# Patient Record
Sex: FEMALE | Race: WHITE | NOT HISPANIC OR LATINO | Employment: UNEMPLOYED | URBAN - METROPOLITAN AREA
[De-identification: names, ages, dates, MRNs, and addresses within clinical notes are randomized per-mention and may not be internally consistent; named-entity substitution may affect disease eponyms.]

---

## 2018-10-23 ENCOUNTER — HOSPITAL ENCOUNTER (EMERGENCY)
Facility: HOSPITAL | Age: 53
Discharge: HOME | End: 2018-10-24
Attending: EMERGENCY MEDICINE
Payer: COMMERCIAL

## 2018-10-23 VITALS
WEIGHT: 157 LBS | HEIGHT: 65 IN | HEART RATE: 76 BPM | BODY MASS INDEX: 26.16 KG/M2 | DIASTOLIC BLOOD PRESSURE: 78 MMHG | TEMPERATURE: 98 F | SYSTOLIC BLOOD PRESSURE: 186 MMHG | OXYGEN SATURATION: 100 % | RESPIRATION RATE: 18 BRPM

## 2018-10-23 DIAGNOSIS — R00.2 PALPITATION: ICD-10-CM

## 2018-10-23 DIAGNOSIS — R07.89 CHEST PRESSURE: ICD-10-CM

## 2018-10-23 PROCEDURE — 93010 ELECTROCARDIOGRAM REPORT: CPT | Mod: ,,, | Performed by: INTERNAL MEDICINE

## 2018-10-23 PROCEDURE — 99285 EMERGENCY DEPT VISIT HI MDM: CPT | Mod: 25

## 2018-10-23 PROCEDURE — 93005 ELECTROCARDIOGRAM TRACING: CPT

## 2018-10-23 PROCEDURE — 99285 EMERGENCY DEPT VISIT HI MDM: CPT | Mod: ,,, | Performed by: EMERGENCY MEDICINE

## 2018-10-24 LAB
ALBUMIN SERPL BCP-MCNC: 3.7 G/DL
ALP SERPL-CCNC: 71 U/L
ALT SERPL W/O P-5'-P-CCNC: 16 U/L
ANION GAP SERPL CALC-SCNC: 8 MMOL/L
AST SERPL-CCNC: 16 U/L
BASOPHILS # BLD AUTO: 0.05 K/UL
BASOPHILS NFR BLD: 0.6 %
BILIRUB SERPL-MCNC: 0.3 MG/DL
BUN SERPL-MCNC: 15 MG/DL
CALCIUM SERPL-MCNC: 9.6 MG/DL
CHLORIDE SERPL-SCNC: 105 MMOL/L
CO2 SERPL-SCNC: 25 MMOL/L
CREAT SERPL-MCNC: 1.2 MG/DL
DIFFERENTIAL METHOD: NORMAL
EOSINOPHIL # BLD AUTO: 0.2 K/UL
EOSINOPHIL NFR BLD: 2 %
ERYTHROCYTE [DISTWIDTH] IN BLOOD BY AUTOMATED COUNT: 12.5 %
EST. GFR  (AFRICAN AMERICAN): >60 ML/MIN/1.73 M^2
EST. GFR  (NON AFRICAN AMERICAN): 52.1 ML/MIN/1.73 M^2
GLUCOSE SERPL-MCNC: 103 MG/DL
HCT VFR BLD AUTO: 40.8 %
HGB BLD-MCNC: 13.4 G/DL
IMM GRANULOCYTES # BLD AUTO: 0.02 K/UL
IMM GRANULOCYTES NFR BLD AUTO: 0.2 %
LYMPHOCYTES # BLD AUTO: 2.8 K/UL
LYMPHOCYTES NFR BLD: 32 %
MCH RBC QN AUTO: 28.9 PG
MCHC RBC AUTO-ENTMCNC: 32.8 G/DL
MCV RBC AUTO: 88 FL
MONOCYTES # BLD AUTO: 0.6 K/UL
MONOCYTES NFR BLD: 7.4 %
NEUTROPHILS # BLD AUTO: 5 K/UL
NEUTROPHILS NFR BLD: 57.8 %
NRBC BLD-RTO: 0 /100 WBC
PLATELET # BLD AUTO: 342 K/UL
PMV BLD AUTO: 9.2 FL
POTASSIUM SERPL-SCNC: 4 MMOL/L
PROT SERPL-MCNC: 6.9 G/DL
RBC # BLD AUTO: 4.64 M/UL
SODIUM SERPL-SCNC: 138 MMOL/L
TROPONIN I SERPL DL<=0.01 NG/ML-MCNC: <0.006 NG/ML
WBC # BLD AUTO: 8.65 K/UL

## 2018-10-24 PROCEDURE — 84484 ASSAY OF TROPONIN QUANT: CPT

## 2018-10-24 PROCEDURE — 85025 COMPLETE CBC W/AUTO DIFF WBC: CPT

## 2018-10-24 PROCEDURE — 80053 COMPREHEN METABOLIC PANEL: CPT

## 2018-10-24 NOTE — ED TRIAGE NOTES
53 year old female pt presents to the ed with complaints of palpitations following sob x 4 hours ago. Pt denies chest pain at this time but expresses hx of afib with cardioversion. Pt is ao x4.

## 2018-10-24 NOTE — ED PROVIDER NOTES
"Encounter Date: 10/23/2018    SCRIBE #1 NOTE: I, Amelia Sanders, am scribing for, and in the presence of,  Dr. Bean. I have scribed the entire note.       History     Chief Complaint   Patient presents with    Palpitations     Pt to ER c/o "chest feeling weird" and SOB while relaxing at home at 1900. She states hx of arrhythmias.     Shortness of Breath     The patient is a 53 y.o. female with PMHx of atrial fibrillation, who presents to the ED for intermitted dyspnea and palpitation since 19:00 today. Patient states she was at rest when episodes occurs. She states it is different from previous episodes. Patient describes it as a "flutter" which is different from her previous episodes. She denies any accompany symptoms with SOB. Denies nausea or diaphoresis. Patient is asymptomatic at this moment. She was admitted twice at an outside hospital for SVT previously. Has a family history of MI. Not on any medications for atrial fibrillation.       The history is provided by the patient.     Review of patient's allergies indicates:  No Known Allergies  Past Medical History:   Diagnosis Date    Atrial fibrillation      History reviewed. No pertinent surgical history.  History reviewed. No pertinent family history.  Social History     Tobacco Use    Smoking status: Never Smoker    Smokeless tobacco: Never Used   Substance Use Topics    Alcohol use: Yes    Drug use: Not on file     Review of Systems   Constitutional: Negative for diaphoresis and fever.   HENT: Negative for sore throat.    Respiratory:        Dyspnea   Cardiovascular: Positive for palpitations. Negative for chest pain.   Gastrointestinal: Negative for nausea.   Genitourinary: Negative for dysuria.   Musculoskeletal: Negative for back pain.   Skin: Negative for rash.   Neurological: Negative for weakness.   Hematological: Does not bruise/bleed easily.       Physical Exam     Initial Vitals   BP Pulse Resp Temp SpO2   10/23/18 2257 10/23/18 2257 10/23/18 " 2257 10/23/18 2257 10/23/18 2335   (!) 198/85 105 20 97.9 °F (36.6 °C) 100 %      MAP       --                Physical Exam  Appearance: No acute distress.  Skin: No rashes seen.  Good turgor.  No abrasions.  No ecchymoses.  Eyes: No conjunctival injection.  ENT: Oropharynx clear.    Chest: Clear to auscultation bilaterally.  Good air movement.  No wheezes.  No rhonchi.  Cardiovascular: Regular rate and rhythm.  No murmurs. No gallops. No rubs.  Abdomen: Soft.  Not distended.  Nontender.  No guarding.  No rebound. No Masses  Musculoskeletal: Good range of motion all joints.  No deformities.  Neck supple.  No meningismus.  Neurologic: Equal strength in upper and lower extremities bilaterally.  Normal sensation.  No facial droop.  Normal speech.    Mental Status:  Alert and oriented x 3.  Appropriate, conversant.    ED Course   Procedures  Labs Reviewed   COMPREHENSIVE METABOLIC PANEL - Abnormal; Notable for the following components:       Result Value    eGFR if non  52.1 (*)     All other components within normal limits   CBC W/ AUTO DIFFERENTIAL   TROPONIN I     EKG Readings: (Independently Interpreted)   Normal sinus rhythm. Occasional PACs. No ST changes. Normal intervals. No T-wave inversion.        Imaging Results          X-Ray Chest PA And Lateral (Final result)  Result time 10/24/18 00:58:49    Final result by Konstantin Paige MD (10/24/18 00:58:49)                 Impression:      No acute cardiopulmonary process.      Electronically signed by: Konstantin Paige MD  Date:    10/24/2018  Time:    00:58             Narrative:    EXAMINATION:  XR CHEST PA AND LATERAL    CLINICAL HISTORY:  Other chest pain    TECHNIQUE:  PA and lateral views of the chest were performed.    COMPARISON:  None.    FINDINGS:  There is no consolidation, effusion, or pneumothorax.    Cardiomediastinal silhouette is unremarkable.    Regional osseous structures are unremarkable.                                 Medical  Decision Making:   History:   Old Medical Records: I decided to obtain old medical records.  Initial Assessment:   Patient here with vague chest palpitation. No signs of arrhythmia such as SVT or atrial fibrillation. Hemodynamically stable. Given her high risk of ACS due to her family history, a troponin and labs were done. It show no acute abnormalities. Chest Xray was unremarkable. Patient otherwise continue to be asymptomatic in the emergency department. Stable for discharge home. Advised pt to follow up with PCP or return if concerning symptoms arise. Pt understands and agrees with plan. Will d/c home.       Independently Interpreted Test(s):   I have ordered and independently interpreted EKG Reading(s) - see prior notes  Clinical Tests:   Lab Tests: Ordered and Reviewed  Radiological Study: Ordered and Reviewed  Medical Tests: Ordered and Reviewed            Scribe Attestation:   Scribe #1: I performed the above scribed service and the documentation accurately describes the services I performed. I attest to the accuracy of the note.               Clinical Impression:   Diagnoses of Palpitation and Chest pressure were pertinent to this visit.      Disposition:   Disposition: Discharged  Condition: Stable                        Franki Bean MD  10/24/18 0728

## 2018-10-24 NOTE — ED NOTES
Pain: Denies at present.    Psychosocial: Patient is calm and cooperative. Patients insight and judgement are appropriate to situation. Appears clean, well maintained, with clothing appropriate to environment. No evidence of delusions, hallucinations, or psychosis.    Neuro: Eyes open spontaneously. Awake, alert, oriented x 4. Speech clear and appropriate. Tolerating saliva secretions well. Able to follow commands, demonstrating ability to actively and appropriately communicate within context of current conversation. Symmetrical facial muscles. Moving all extremities well with no noted weakness. Adequate muscle tone present. Movement is purposeful. No evidence of impaired sensation. Responds to external stimuli with appropriate reflexes.     Airway: Bilateral chest rise and fall. RR regular and non-labored. Air entry patent and clear x 5 lobes of the lungs. No crepitus or subcutaneous emphysema noted on palpation.     Circulatory: Skin warm, dry, and pink. Apical and radial pulses strong and regular. Capillary refill/skin blanching less than 3 seconds to distal of 4 extremities. Placed on CM in NSR without ectopy.    Abdomen: Abdomen obese, soft and non-distended. Positive normo-active bowel sounds x 4 quadrants.     Urinary: Patient reports routine urination without pain, frequency, or urgency. Voids independently. Reports urine appears wally/yellow in color.    Extremities: No redness, heat, swelling, deformity, or pain.     Skin: Intact with no bruising/discolorations noted.

## 2021-08-17 ENCOUNTER — IMMUNIZATION (OUTPATIENT)
Dept: FAMILY MEDICINE | Facility: CLINIC | Age: 56
End: 2021-08-17
Payer: COMMERCIAL

## 2021-08-17 DIAGNOSIS — Z23 NEED FOR VACCINATION: Primary | ICD-10-CM

## 2021-08-17 PROCEDURE — 0001A COVID-19, MRNA, LNP-S, PF, 30 MCG/0.3 ML DOSE VACCINE: ICD-10-PCS | Mod: CV19,,, | Performed by: FAMILY MEDICINE

## 2021-08-17 PROCEDURE — 91300 COVID-19, MRNA, LNP-S, PF, 30 MCG/0.3 ML DOSE VACCINE: ICD-10-PCS | Mod: ,,, | Performed by: FAMILY MEDICINE

## 2021-08-17 PROCEDURE — 0001A COVID-19, MRNA, LNP-S, PF, 30 MCG/0.3 ML DOSE VACCINE: CPT | Mod: CV19,,, | Performed by: FAMILY MEDICINE

## 2021-08-17 PROCEDURE — 91300 COVID-19, MRNA, LNP-S, PF, 30 MCG/0.3 ML DOSE VACCINE: CPT | Mod: ,,, | Performed by: FAMILY MEDICINE

## 2021-09-21 ENCOUNTER — IMMUNIZATION (OUTPATIENT)
Dept: FAMILY MEDICINE | Facility: CLINIC | Age: 56
End: 2021-09-21
Payer: COMMERCIAL

## 2021-09-21 DIAGNOSIS — Z23 NEED FOR VACCINATION: Primary | ICD-10-CM

## 2021-09-21 PROCEDURE — 0002A COVID-19, MRNA, LNP-S, PF, 30 MCG/0.3 ML DOSE VACCINE: CPT | Mod: PBBFAC | Performed by: FAMILY MEDICINE

## 2021-09-21 PROCEDURE — 91300 COVID-19, MRNA, LNP-S, PF, 30 MCG/0.3 ML DOSE VACCINE: CPT | Mod: PBBFAC | Performed by: FAMILY MEDICINE

## 2021-12-28 ENCOUNTER — LAB VISIT (OUTPATIENT)
Dept: PRIMARY CARE CLINIC | Facility: OTHER | Age: 56
End: 2021-12-28
Payer: COMMERCIAL

## 2021-12-28 DIAGNOSIS — R05.9 COUGH: ICD-10-CM

## 2021-12-28 PROCEDURE — U0003 INFECTIOUS AGENT DETECTION BY NUCLEIC ACID (DNA OR RNA); SEVERE ACUTE RESPIRATORY SYNDROME CORONAVIRUS 2 (SARS-COV-2) (CORONAVIRUS DISEASE [COVID-19]), AMPLIFIED PROBE TECHNIQUE, MAKING USE OF HIGH THROUGHPUT TECHNOLOGIES AS DESCRIBED BY CMS-2020-01-R: HCPCS

## 2021-12-30 LAB
SARS-COV-2 RNA RESP QL NAA+PROBE: NOT DETECTED
SARS-COV-2- CYCLE NUMBER: NORMAL

## 2022-11-23 ENCOUNTER — TELEPHONE (OUTPATIENT)
Dept: URGENT CARE | Facility: CLINIC | Age: 57
End: 2022-11-23

## 2022-11-23 ENCOUNTER — OFFICE VISIT (OUTPATIENT)
Dept: URGENT CARE | Facility: CLINIC | Age: 57
End: 2022-11-23
Payer: COMMERCIAL

## 2022-11-23 VITALS
WEIGHT: 157 LBS | TEMPERATURE: 99 F | HEIGHT: 65 IN | OXYGEN SATURATION: 96 % | HEART RATE: 90 BPM | SYSTOLIC BLOOD PRESSURE: 156 MMHG | DIASTOLIC BLOOD PRESSURE: 78 MMHG | RESPIRATION RATE: 18 BRPM | BODY MASS INDEX: 26.16 KG/M2

## 2022-11-23 DIAGNOSIS — J06.9 VIRAL URI: ICD-10-CM

## 2022-11-23 DIAGNOSIS — R05.9 COUGH, UNSPECIFIED TYPE: Primary | ICD-10-CM

## 2022-11-23 LAB
CTP QC/QA: YES
POC MOLECULAR INFLUENZA A AGN: NEGATIVE
POC MOLECULAR INFLUENZA A AGN: POSITIVE
POC MOLECULAR INFLUENZA B AGN: NEGATIVE
POC MOLECULAR INFLUENZA B AGN: NEGATIVE
SARS-COV-2 RDRP RESP QL NAA+PROBE: NEGATIVE

## 2022-11-23 PROCEDURE — 3077F PR MOST RECENT SYSTOLIC BLOOD PRESSURE >= 140 MM HG: ICD-10-PCS | Mod: CPTII,S$GLB,, | Performed by: INTERNAL MEDICINE

## 2022-11-23 PROCEDURE — 99204 OFFICE O/P NEW MOD 45 MIN: CPT | Mod: S$GLB,,, | Performed by: INTERNAL MEDICINE

## 2022-11-23 PROCEDURE — 3008F BODY MASS INDEX DOCD: CPT | Mod: CPTII,S$GLB,, | Performed by: INTERNAL MEDICINE

## 2022-11-23 PROCEDURE — 87502 POCT INFLUENZA A/B MOLECULAR: ICD-10-PCS | Mod: QW,S$GLB,, | Performed by: INTERNAL MEDICINE

## 2022-11-23 PROCEDURE — 87635 SARS-COV-2 COVID-19 AMP PRB: CPT | Mod: QW,S$GLB,, | Performed by: INTERNAL MEDICINE

## 2022-11-23 PROCEDURE — 87635: ICD-10-PCS | Mod: QW,S$GLB,, | Performed by: INTERNAL MEDICINE

## 2022-11-23 PROCEDURE — 3008F PR BODY MASS INDEX (BMI) DOCUMENTED: ICD-10-PCS | Mod: CPTII,S$GLB,, | Performed by: INTERNAL MEDICINE

## 2022-11-23 PROCEDURE — 1159F MED LIST DOCD IN RCRD: CPT | Mod: CPTII,S$GLB,, | Performed by: INTERNAL MEDICINE

## 2022-11-23 PROCEDURE — 3077F SYST BP >= 140 MM HG: CPT | Mod: CPTII,S$GLB,, | Performed by: INTERNAL MEDICINE

## 2022-11-23 PROCEDURE — 3078F PR MOST RECENT DIASTOLIC BLOOD PRESSURE < 80 MM HG: ICD-10-PCS | Mod: CPTII,S$GLB,, | Performed by: INTERNAL MEDICINE

## 2022-11-23 PROCEDURE — 1159F PR MEDICATION LIST DOCUMENTED IN MEDICAL RECORD: ICD-10-PCS | Mod: CPTII,S$GLB,, | Performed by: INTERNAL MEDICINE

## 2022-11-23 PROCEDURE — 3078F DIAST BP <80 MM HG: CPT | Mod: CPTII,S$GLB,, | Performed by: INTERNAL MEDICINE

## 2022-11-23 PROCEDURE — 99204 PR OFFICE/OUTPT VISIT, NEW, LEVL IV, 45-59 MIN: ICD-10-PCS | Mod: S$GLB,,, | Performed by: INTERNAL MEDICINE

## 2022-11-23 PROCEDURE — 87502 INFLUENZA DNA AMP PROBE: CPT | Mod: QW,S$GLB,, | Performed by: INTERNAL MEDICINE

## 2022-11-23 RX ORDER — ASPIRIN 325 MG
50000 TABLET, DELAYED RELEASE (ENTERIC COATED) ORAL
COMMUNITY
Start: 2022-08-27

## 2022-11-23 RX ORDER — BENZONATATE 100 MG/1
100 CAPSULE ORAL 3 TIMES DAILY PRN
Qty: 30 CAPSULE | Refills: 0 | Status: SHIPPED | OUTPATIENT
Start: 2022-11-23 | End: 2022-12-03

## 2022-11-23 RX ORDER — PROMETHAZINE HYDROCHLORIDE AND DEXTROMETHORPHAN HYDROBROMIDE 6.25; 15 MG/5ML; MG/5ML
5 SYRUP ORAL EVERY 4 HOURS PRN
Qty: 180 ML | Refills: 0 | Status: SHIPPED | OUTPATIENT
Start: 2022-11-23 | End: 2022-12-03

## 2022-11-23 NOTE — TELEPHONE ENCOUNTER
Patient called and informed of Positive Flu A results and negative Flu B results. Patient states that her primary care provider's office called her in some Tamiflu that she is going to begin tonight. Patient verbalized understanding and advised if she has any further questions she can contact the office.

## 2022-11-23 NOTE — PROGRESS NOTES
"Subjective:       Patient ID: Crystal Garcia is a 57 y.o. female.    Vitals:  height is 5' 5" (1.651 m). Her blood pressure is 156/78 (abnormal) and her pulse is 90. Her respiration is 18 and oxygen saturation is 96%.     Chief Complaint: Sinus Problem    Sinus Problem  Associated symptoms include congestion, coughing and a sore throat.     HENT:  Positive for congestion and sore throat.    Respiratory:  Positive for cough and sputum production.    Musculoskeletal:  Negative for muscle ache.     Objective:      Physical Exam      Assessment:       No diagnosis found.      Plan:         There are no diagnoses linked to this encounter.                 "

## 2022-11-23 NOTE — PROGRESS NOTES
"Subjective:       Patient ID: Crystal Garcia is a 57 y.o. female.    Vitals:  height is 5' 5" (1.651 m). Her blood pressure is 156/78 (abnormal) and her pulse is 90. Her respiration is 18 and oxygen saturation is 96%.     Chief Complaint: Sinus Problem    Patient presents with a dry cough and sinus congestion Symptom onset has been gradual and started a day ago. Patient denies fever ,however feels lethargic and states she has been sleeping all day. Patient states she took covid test at home and has tested negative .     Sinus Problem  This is a new problem. The current episode started yesterday. The problem is unchanged. There has been no fever. Her pain is at a severity of 3/10. The pain is mild. Associated symptoms include congestion, coughing and sinus pressure. Pertinent negatives include no chills, headaches, hoarse voice or neck pain. Treatments tried: advil ,allegra. The treatment provided moderate relief.     Constitution: Negative for chills.   HENT:  Positive for congestion and sinus pressure.    Neck: Negative for neck pain.   Respiratory:  Positive for cough.    Skin:  Negative for erythema.   Neurological:  Negative for headaches.     Objective:      Physical Exam   Constitutional: She is oriented to person, place, and time. She appears well-developed.  Non-toxic appearance. She does not appear ill. No distress.   HENT:   Head: Normocephalic and atraumatic.   Ears:   Right Ear: External ear normal.   Left Ear: External ear normal.   Nose: Nose normal.   Mouth/Throat: Oropharynx is clear and moist. Mucous membranes are moist. No oropharyngeal exudate. Oropharynx is clear.   Eyes: Conjunctivae and EOM are normal. Pupils are equal, round, and reactive to light. Right eye exhibits no discharge. Left eye exhibits no discharge. No scleral icterus.   Neck: Neck supple.   Cardiovascular: Normal rate, regular rhythm and normal heart sounds.   No murmur heard.Exam reveals no gallop and no friction rub. "   Pulmonary/Chest: Effort normal. No respiratory distress. She has no wheezes. She has no rales.   Abdominal: Bowel sounds are normal. She exhibits no distension. Soft.   Lymphadenopathy:     She has no cervical adenopathy.   Neurological: She is alert and oriented to person, place, and time.   Skin: Skin is warm, dry, not diaphoretic and no rash. Capillary refill takes less than 2 seconds. No erythema   Psychiatric: Her behavior is normal.   Nursing note and vitals reviewed.      Assessment:       1. Cough, unspecified type    2. Viral URI          Plan:         Cough, unspecified type  -     POCT Influenza A/B MOLECULAR  -     POCT Influenza A/B MOLECULAR    Viral URI  -     POCT COVID-19 Rapid Screening  -     promethazine-dextromethorphan (PROMETHAZINE-DM) 6.25-15 mg/5 mL Syrp; Take 5 mLs by mouth every 4 (four) hours as needed.  Dispense: 180 mL; Refill: 0  -     benzonatate (TESSALON) 100 MG capsule; Take 1 capsule (100 mg total) by mouth 3 (three) times daily as needed for Cough.  Dispense: 30 capsule; Refill: 0

## 2023-06-25 ENCOUNTER — OFFICE VISIT (OUTPATIENT)
Dept: URGENT CARE | Facility: CLINIC | Age: 58
End: 2023-06-25
Payer: COMMERCIAL

## 2023-06-25 VITALS
RESPIRATION RATE: 16 BRPM | WEIGHT: 160 LBS | SYSTOLIC BLOOD PRESSURE: 145 MMHG | OXYGEN SATURATION: 98 % | TEMPERATURE: 98 F | DIASTOLIC BLOOD PRESSURE: 65 MMHG | HEART RATE: 75 BPM | HEIGHT: 65 IN | BODY MASS INDEX: 26.66 KG/M2

## 2023-06-25 DIAGNOSIS — J01.00 ACUTE MAXILLARY SINUSITIS, RECURRENCE NOT SPECIFIED: Primary | ICD-10-CM

## 2023-06-25 DIAGNOSIS — J30.9 ALLERGIC RHINITIS, UNSPECIFIED SEASONALITY, UNSPECIFIED TRIGGER: ICD-10-CM

## 2023-06-25 DIAGNOSIS — H10.33 ACUTE CONJUNCTIVITIS OF BOTH EYES, UNSPECIFIED ACUTE CONJUNCTIVITIS TYPE: ICD-10-CM

## 2023-06-25 PROCEDURE — 99213 OFFICE O/P EST LOW 20 MIN: CPT | Mod: S$GLB,,, | Performed by: PHYSICIAN ASSISTANT

## 2023-06-25 PROCEDURE — 99213 PR OFFICE/OUTPT VISIT, EST, LEVL III, 20-29 MIN: ICD-10-PCS | Mod: S$GLB,,, | Performed by: PHYSICIAN ASSISTANT

## 2023-06-25 RX ORDER — PREDNISONE 20 MG/1
20 TABLET ORAL DAILY
Qty: 4 TABLET | Refills: 0 | Status: SHIPPED | OUTPATIENT
Start: 2023-06-25 | End: 2023-06-29

## 2023-06-25 RX ORDER — AZELASTINE HYDROCHLORIDE 0.5 MG/ML
1 SOLUTION/ DROPS OPHTHALMIC 2 TIMES DAILY
Qty: 6 ML | Refills: 0 | Status: SHIPPED | OUTPATIENT
Start: 2023-06-25

## 2023-06-25 RX ORDER — TRIAMCINOLONE ACETONIDE 1 MG/G
CREAM TOPICAL 3 TIMES DAILY
COMMUNITY
Start: 2023-06-18

## 2023-06-25 NOTE — PATIENT INSTRUCTIONS
You have been prescribed a steroid today. Take the prescription as directed. Steroids can increase blood sugar. You can also have the following when taking steroids: flushing, jitteriness, weight gain, fluid retention, bone weakening. If you develop any adverse symptoms, stop taking the medication immediately.    You must understand that you've received an Urgent Care treatment only and that you may be released before all your medical problems are known or treated. You, the patient, will arrange for follow up care as instructed.      Follow up with your PCP or specialty clinic as instructed in the next 2-3 days if not improved or as needed. You can call [phone] to schedule an appointment with appropriate provider.      If you condition worsens, we recommend that you receive another evaluation at the emergency room immediately or contact your primary medical clinic's after hours call service to discuss your concerns.      Please return here or go to the Emergency Department for any concerns or worsening condition.      If you were prescribed a narcotic or controlled substance, do not drive or operate heavy equipment or machinery while taking these medications.

## 2023-06-25 NOTE — PROGRESS NOTES
"Subjective:      Patient ID: Crystal Garcia is a 58 y.o. female.    Vitals:  height is 5' 5" (1.651 m) and weight is 72.6 kg (160 lb). Her oral temperature is 97.7 °F (36.5 °C). Her blood pressure is 145/65 (abnormal) and her pulse is 75. Her respiration is 16 and oxygen saturation is 98%.     Chief Complaint: Sinus Problem    Pt complains of sinus problems that started three days ago. Pt is taking mucinex dm and having no relief. Pt states she has taken 2 at home covid test and results were negative     Patient provider note starts here:  Patient presents with a 3 day history of sinus congestion, sore throat, postnasal drip, eye irritation.  Reports she is been taking Mucinex DM which has not given her any significant relief.  She is taken 2 COVID test at home which were both negative.  Denies any known ill contacts or fevers.  Additionally denies any significant pain to the eyes or changes in vision.      Sinus Problem  This is a new problem. Episode onset: 3 days. The problem is unchanged. There has been no fever. Her pain is at a severity of 0/10. She is experiencing no pain. Associated symptoms include congestion, coughing, headaches, sinus pressure and a sore throat. Pertinent negatives include no chills, diaphoresis, ear pain, hoarse voice, neck pain, shortness of breath, sneezing or swollen glands. Past treatments include oral decongestants. The treatment provided mild relief.   Constitution: Negative for chills, sweating and fever.   HENT:  Positive for congestion, postnasal drip, sinus pressure and sore throat. Negative for ear pain.    Neck: Negative for neck pain.   Eyes:  Positive for eye discharge, eye itching and eye redness. Negative for eye pain, photophobia, blurred vision and eyelid swelling.   Respiratory:  Positive for cough. Negative for sputum production and shortness of breath.    Allergic/Immunologic: Negative for sneezing.   Neurological:  Positive for headaches.    Objective: "     Physical Exam   Constitutional: She is oriented to person, place, and time. She appears well-developed. She is cooperative.  Non-toxic appearance. She does not appear ill. No distress.   HENT:   Head: Normocephalic and atraumatic.   Ears:   Right Ear: Hearing, tympanic membrane, external ear and ear canal normal.   Left Ear: Hearing, tympanic membrane, external ear and ear canal normal.   Nose: Congestion present. No mucosal edema, rhinorrhea or nasal deformity. No epistaxis. Right sinus exhibits no maxillary sinus tenderness and no frontal sinus tenderness. Left sinus exhibits no maxillary sinus tenderness and no frontal sinus tenderness.   Mouth/Throat: Uvula is midline and mucous membranes are normal. No trismus in the jaw. Normal dentition. No uvula swelling. Posterior oropharyngeal erythema present. No oropharyngeal exudate or posterior oropharyngeal edema.   Eyes: Lids are normal. Pupils are equal, round, and reactive to light. Lids are everted and swept, no foreign bodies found. Right eye exhibits no discharge and no exudate. Left eye exhibits no discharge and no exudate. Right conjunctiva is injected. Left conjunctiva is injected. No scleral icterus. Extraocular movement intact   Neck: Trachea normal and phonation normal. Neck supple. No edema present. No erythema present. No neck rigidity present.   Cardiovascular: Normal rate, regular rhythm, normal heart sounds and normal pulses.   Pulmonary/Chest: Effort normal and breath sounds normal. No respiratory distress. She has no decreased breath sounds. She has no wheezes. She has no rhonchi.   Abdominal: Normal appearance.   Musculoskeletal: Normal range of motion.         General: No deformity. Normal range of motion.   Lymphadenopathy:     She has cervical adenopathy.   Neurological: She is alert and oriented to person, place, and time. She exhibits normal muscle tone. Coordination normal.   Skin: Skin is warm, dry, intact, not diaphoretic and not pale.    Psychiatric: Her speech is normal and behavior is normal. Judgment and thought content normal.   Nursing note and vitals reviewed.    Assessment:     1. Acute maxillary sinusitis, recurrence not specified    2. Allergic rhinitis, unspecified seasonality, unspecified trigger    3. Acute conjunctivitis of both eyes, unspecified acute conjunctivitis type        Plan:       Acute maxillary sinusitis, recurrence not specified  -     predniSONE (DELTASONE) 20 MG tablet; Take 1 tablet (20 mg total) by mouth once daily. for 4 days  Dispense: 4 tablet; Refill: 0    Allergic rhinitis, unspecified seasonality, unspecified trigger  -     predniSONE (DELTASONE) 20 MG tablet; Take 1 tablet (20 mg total) by mouth once daily. for 4 days  Dispense: 4 tablet; Refill: 0    Acute conjunctivitis of both eyes, unspecified acute conjunctivitis type  -     azelastine (OPTIVAR) 0.05 % ophthalmic solution; Place 1 drop into both eyes 2 (two) times daily.  Dispense: 6 mL; Refill: 0          Medical Decision Making:   History:   Old Medical Records: I decided to obtain old medical records.  Urgent Care Management:  A. Problem List:   -Acute:  Allergic rhinitis, acute sinusitis, bilateral conjunctivitis   -Chronic:  Paroxysmal atrial fibrillation  B. Differential diagnosis:  Allergic conjunctivitis/rhinitis, viral URI with conjunctivitis, adenovirus, COVID-19, acute bacterial sinusitis, bacterial conjunctivitis  C. Diagnostic Testing Ordered:  None  D. Diagnostic Testing Considered:  COVID  E. Independent Historians:  F. Urgent Care Midlevel Independent Results Interpretation:   G. Radiology:  H. Review of Previous Medical Records: completed  I. Home Medications Reviewed  J. Social Determinants of Health considered  K. Medical Decision Making and Disposition:  Patient presents to the clinic with complaints of URI like symptoms with associated bilateral eye redness and irritation.  On exam, she is afebrile and nontoxic in appearance.  Exam  findings are most consistent with allergic versus viral etiology.  She was prescribed symptomatic treatment with prednisone and advised of the risks of taking steroids.  Also prescribed an antihistamine eye drop for her eye irritation.  I do not appreciate any bacterial causes at this time.  Advised close follow-up with primary care in ED precautions discussed.  She verbalized understanding and agreed with this plan.       Patient Instructions   You have been prescribed a steroid today. Take the prescription as directed. Steroids can increase blood sugar. You can also have the following when taking steroids: flushing, jitteriness, weight gain, fluid retention, bone weakening. If you develop any adverse symptoms, stop taking the medication immediately.    You must understand that you've received an Urgent Care treatment only and that you may be released before all your medical problems are known or treated. You, the patient, will arrange for follow up care as instructed.      Follow up with your PCP or specialty clinic as instructed in the next 2-3 days if not improved or as needed. You can call [phone] to schedule an appointment with appropriate provider.      If you condition worsens, we recommend that you receive another evaluation at the emergency room immediately or contact your primary medical clinic's after hours call service to discuss your concerns.      Please return here or go to the Emergency Department for any concerns or worsening condition.      If you were prescribed a narcotic or controlled substance, do not drive or operate heavy equipment or machinery while taking these medications.

## 2023-06-28 ENCOUNTER — TELEPHONE (OUTPATIENT)
Dept: URGENT CARE | Facility: CLINIC | Age: 58
End: 2023-06-28
Payer: COMMERCIAL